# Patient Record
(demographics unavailable — no encounter records)

---

## 2025-05-13 NOTE — PHYSICAL EXAM
[Oriented x3] : oriented x3 [Examination Of The Breasts] : a normal appearance [No Discharge] : no discharge [No Masses] : no breast masses were palpable [Labia Majora] : normal [Labia Minora] : normal [No Bleeding] : There was no active vaginal bleeding [Normal] : normal [Normal Position] : in a normal position [Uterine Adnexae] : normal [FreeTextEntry3] : Thyroid is non-enlarged, nontender. Small right thyroid nodule palpated. No lymphadenopathy.  [FreeTextEntry7] : Soft. Nondistended. Nontender. No rebound or guarding. There are no palpable masses. [Vulvar Atrophy] : vulvar atrophy [Atrophy] : atrophy [FreeTextEntry1] : External genitalia are within normal limits with no lesions visualized. [FreeTextEntry2] : Mild leukoplakia involving the labia majora. Mild erythema involving the labia minora. [FreeTextEntry4] : Scant amount of white discharge noted within the vaginal vault. No blood or lesions noted. [FreeTextEntry5] : A speculum was inserted without any difficulty. The cervix was normal in appearance. Small erythematous polyp extending from the cervical os. Pap smear and cultures were obtained. No cervical motion tenderness. [FreeTextEntry6] : Bimanual examination was notable for a normal, nontender uterus. There were no palpable adnexal masses or adnexal tenderness. [FreeTextEntry9] : No lesions. No palpable masses.

## 2025-05-13 NOTE — HISTORY OF PRESENT ILLNESS
[Patient reported mammogram was normal] : Patient reported mammogram was normal [Patient reported PAP Smear was normal] : Patient reported PAP Smear was normal [Patient reported colonoscopy was normal] : Patient reported colonoscopy was normal [LMP unknown] : LMP unknown [postmenopausal] : postmenopausal [Y] : Positive pregnancy history [unknown] : Patient is unsure of the date of her LMP [Menarche Age: ____] : age at menarche was [unfilled] [Post-Menopause, No Sxs] : post-menopausal, currently without symptoms [Menopause Age: ____] : age at menopause was [unfilled] [No] : Patient does not have concerns regarding sex [Currently Active] : currently active [Men] : men [TextBox_4] : 66-year-old  2 para 0 postmenopausal presents for an annual examination. She reports experiencing increased vaginal discharge, irritation, and pain during intercourse which started 2 weeks ago. She states she tried a 1-day Monistat with no relief. She then tried a 3-day with mild relief. She denies any fevers or back/abdominal pain. [Mammogramdate] : 2021 [PapSmeardate] : 2021 [ColonoscopyDate] : 2023 [TextBox_43] : Repeat screening in 7 years [PGHxTotal] : 3 [PGHxABSpont] : 1 [PGxEctopic] : 1

## 2025-05-13 NOTE — REVIEW OF SYSTEMS
[Patient Intake Form Reviewed] : Patient intake form was reviewed [FreeTextEntry8] : Increased vaginal discharge, irritation, vaginal burning, and pain during intercourse

## 2025-05-13 NOTE — PLAN
[FreeTextEntry1] : Wellness exam.   Vulvitis. Rx triamcinolone and nystatin ointment. Leukorrhea.  Dyspareunia.  Rule out vaginitis.  Cultures pending. Atrophic vaginitis.  Information given on Replens and Revaree plus.  Sample given.  Recommendations: Mammography, bone density scan, and dermatological examination.   Status post colonoscopy in 2023. Repeat screening in 7 years. Status post endoscopy in 2023. Normal. Status post ophthalmological and dental examinations which are performed annually.    Discussed proper nutrition and physical exercise. Reviewed age-appropriate vaccinations.